# Patient Record
Sex: MALE | Race: BLACK OR AFRICAN AMERICAN | NOT HISPANIC OR LATINO | Employment: FULL TIME | URBAN - METROPOLITAN AREA
[De-identification: names, ages, dates, MRNs, and addresses within clinical notes are randomized per-mention and may not be internally consistent; named-entity substitution may affect disease eponyms.]

---

## 2017-04-24 ENCOUNTER — OFFICE VISIT (OUTPATIENT)
Dept: FAMILY MEDICINE | Facility: CLINIC | Age: 27
End: 2017-04-24

## 2017-04-24 VITALS
HEART RATE: 66 BPM | HEIGHT: 75 IN | DIASTOLIC BLOOD PRESSURE: 78 MMHG | WEIGHT: 175.2 LBS | TEMPERATURE: 97.8 F | SYSTOLIC BLOOD PRESSURE: 127 MMHG | BODY MASS INDEX: 21.78 KG/M2

## 2017-04-24 DIAGNOSIS — M20.5X1 ACQUIRED CLAW TOE OF RIGHT FOOT: Primary | ICD-10-CM

## 2017-04-24 DIAGNOSIS — L84 CALLUS OF FOOT: ICD-10-CM

## 2017-04-24 NOTE — PATIENT INSTRUCTIONS
Your referral has been scheduled for:    [facility]   [address] [place] [address]  [phone]  Date: Thursday May 18 2017  Time: 10: 00 AM Dr Moraes    If you have any questions or need to reschedule please call the number listed above.  Any other concerns please call our referral coordinator at [phone]    Le  Care Coordinator     GAVE TO PATIENT

## 2017-04-24 NOTE — PROGRESS NOTES
Preceptor attestation:  Patient seen and discussed with the resident. Assessment and plan reviewed with resident and agreed upon.  Supervising physician: Chio Bellamy  [facility]

## 2017-04-24 NOTE — LETTER
RETURN TO WORK/SCHOOL FORM    4/24/2017    Re: Sommer Means  1990      To Whom It May Concern:     Sommer Means was seen in clinic today..  He may return to work without restrictions on 4/25/17.  Please excuse from work 4/23/17 and 4/24/17.        Restrictions:  None      Lizzie Chow MD  4/24/2017 9:05 AM

## 2017-04-24 NOTE — PROGRESS NOTES
"       SUBJECTIVE       Sommer Means is a 26 year old  male with a PMH significant for:   There is no problem list on file for this patient.    He presents with concerns of pain over his right 5th toe.  He did not have any injury to the area and notes the pain is so severe that he is unable to wear shoes.       Patient is a [employer]  and required wearing close toed shoes.  The pain over his right foot has become so severe when he wears shoes that he cannot go to work and has missed two days.  He notes that over the last 6 months his 5th toes have slightly changed shape with the toe \"sticking up\" more.  He started to have a callous over the right and left toes about 2 months ago and the last two weeks the pain has increased.  No redness or drainage over the area.  He is otherwise healthy. No family history of diabetes or nerve problems.  He has had no numbness or tingling over the toes.  Has full strength.  No fevers or chills.  Otherwise feels well.      PMH, Medications and Allergies were reviewed and updated as needed.        REVIEW OF SYSTEMS     Pertinent review, per HPI.        OBJECTIVE     Vitals:    04/24/17 0856   BP: 127/78   Pulse: 66   Temp: 97.8  F (36.6  C)   Weight: 175 lb 3.2 oz (79.5 kg)   Height: 6' 3\" (190.5 cm)     Body mass index is 21.9 kg/(m^2).    Constitutional: Well appearing, no acute distress.  Respiratory: No respiratory distress  Extremities: Patient with claw deformity of 3rd, 4th (mild) and 5th digit of right and left toes with 1cm thickened area of hypertrophic skin and callous over the 5th right toe.  Mild hypertrophic skin over left 5th digit on dorsal surface, but worse on right vs left.  No ulceration or skin breakdown.  No erythema, fluctuance or warmth.  No other ulcerations  Neuro: monofilament testing is normal on right and left feet.  Sensation is full, gait normal.     No results found for this or any previous visit (from the past 24 hour(s)).        ASSESSMENT AND PLAN "     Right foot pain: Patient with extension of the proximal joint of the 3rd - 5th toes bilaterally causing clawing of the toes.  No numbness in the foot, patient healthy otherwise and 26 years old, BMI 21.  No other evidence of neuropathy and sensory exam was normal today.  Podiatry referral for assistance in shoe fitting, callous debridement and management.  Recommend follow up as needed.   - Podiatry   - Recommend wearing corn pads or bandaids to help prevent friction in the meantime  - Work note was given       RTC prn or sooner if develops new or worsening symptoms.      Lizzie Chow MD  PGY-3 [facility]

## 2017-04-24 NOTE — MR AVS SNAPSHOT
After Visit Summary   4/24/2017    Sommer Means    MRN: [id number]           Patient Information     Date Of Birth          1990        Visit Information        Provider Department      4/24/2017 8:40 AM Lizzie Chow MD [facility]        Today's Diagnoses     Acquired claw toe of right foot    -  1       Follow-ups after your visit        Additional Services     PODIATRY/FOOT & ANKLE SURGERY REFERRAL       Patient to stop at the [facility] Desk    Reason for Referral: Callous and claw foot deformity right 5th digit.  Foot pain.      needed: No  Language: English    May leave message on voicemail: Yes    ([place] Only) Referral should be tracked (Yes/No)?                  Future tests that were ordered for you today     Open Future Orders        Priority Expected Expires Ordered    PODIATRY/FOOT & ANKLE SURGERY REFERRAL Routine  4/24/2018 4/24/2017            Who to contact     Please call your clinic at [phone] to:    Ask questions about your health    Make or cancel appointments    Discuss your medicines    Learn about your test results    Speak to your doctor   If you have compliments or concerns about an experience at your clinic, or if you wish to file a complaint, please contact [facility] Patient Relations at [phone] or email us at [email or web address]         Additional Information About Your Visit        MyChart Information     [employer] is an electronic gateway that provides easy, online access to your medical records. With [employer], you can request a clinic appointment, read your test results, renew a prescription or communicate with your care team.     To sign up for [employer] visit the website at [email or web address]   You will be asked to enter the access code listed below, as well as some personal information. Please follow the directions to create your username and password.     Your access code is: [email or web address]  Expires:  "2017  9:16 AM     Your access code will  in 90 days. If you need help or a new code, please contact your [facility] or call [phone] for assistance.        Care EveryWhere ID     This is your Care EveryWhere ID. This could be used by other organizations to access your [facility] medical records  [id number]        Your Vitals Were     Pulse Temperature Height BMI (Body Mass Index)          66 97.8  F (36.6  C) 6' 3\" (190.5 cm) 21.9 kg/m2         Blood Pressure from Last 3 Encounters:   17 127/78    Weight from Last 3 Encounters:   17 175 lb 3.2 oz (79.5 kg)               Primary Care Provider Office Phone # Fax #    Kenny ArchibaldDO [phone]       [address] [address]        Thank you!     Thank you for choosing [facility]  for your care. Our goal is always to provide you with excellent care. Hearing back from our patients is one way we can continue to improve our services. Please take a few minutes to complete the written survey that you may receive in the mail after your visit with us. Thank you!             Your Updated Medication List - Protect others around you: Learn how to safely use, store and throw away your medicines at www.disposemymeds.org.      Notice  As of 2017  9:16 AM    You have not been prescribed any medications.      "

## 2021-09-12 ENCOUNTER — HOSPITAL ENCOUNTER (EMERGENCY)
Facility: CLINIC | Age: 31
Discharge: HOME | End: 2021-09-12
Attending: NURSE PRACTITIONER | Admitting: NURSE PRACTITIONER
Payer: COMMERCIAL

## 2021-09-12 ENCOUNTER — OFFICE VISIT (OUTPATIENT)
Dept: URGENT CARE | Facility: URGENT CARE | Age: 31
End: 2021-09-12
Payer: COMMERCIAL

## 2021-09-12 VITALS
RESPIRATION RATE: 16 BRPM | TEMPERATURE: 97.8 F | DIASTOLIC BLOOD PRESSURE: 86 MMHG | SYSTOLIC BLOOD PRESSURE: 138 MMHG | HEART RATE: 73 BPM | OXYGEN SATURATION: 99 %

## 2021-09-12 VITALS
SYSTOLIC BLOOD PRESSURE: 112 MMHG | HEART RATE: 79 BPM | OXYGEN SATURATION: 98 % | BODY MASS INDEX: 25.06 KG/M2 | DIASTOLIC BLOOD PRESSURE: 70 MMHG | WEIGHT: 200.5 LBS | TEMPERATURE: 97.5 F

## 2021-09-12 DIAGNOSIS — M79.603 ARM PAIN: ICD-10-CM

## 2021-09-12 DIAGNOSIS — L02.419 CELLULITIS AND ABSCESS OF UPPER EXTREMITY: Primary | ICD-10-CM

## 2021-09-12 DIAGNOSIS — L03.90 CELLULITIS: ICD-10-CM

## 2021-09-12 DIAGNOSIS — L03.119 CELLULITIS AND ABSCESS OF UPPER EXTREMITY: Primary | ICD-10-CM

## 2021-09-12 DIAGNOSIS — L02.419 ARM ABSCESS: ICD-10-CM

## 2021-09-12 PROCEDURE — 99283 EMERGENCY DEPT VISIT LOW MDM: CPT

## 2021-09-12 PROCEDURE — 250N000009 HC RX 250: Performed by: NURSE PRACTITIONER

## 2021-09-12 PROCEDURE — 271N000002 HC RX 271: Performed by: NURSE PRACTITIONER

## 2021-09-12 PROCEDURE — 10060 I&D ABSCESS SIMPLE/SINGLE: CPT

## 2021-09-12 PROCEDURE — 99204 OFFICE O/P NEW MOD 45 MIN: CPT | Performed by: STUDENT IN AN ORGANIZED HEALTH CARE EDUCATION/TRAINING PROGRAM

## 2021-09-12 RX ORDER — SULFAMETHOXAZOLE/TRIMETHOPRIM 800-160 MG
1 TABLET ORAL 2 TIMES DAILY
Qty: 20 TABLET | Refills: 0 | Status: SHIPPED | OUTPATIENT
Start: 2021-09-12 | End: 2021-09-22

## 2021-09-12 RX ORDER — METHYLCELLULOSE 4000CPS 30 %
POWDER (GRAM) MISCELLANEOUS ONCE
Status: COMPLETED | OUTPATIENT
Start: 2021-09-12 | End: 2021-09-12

## 2021-09-12 RX ADMIN — Medication: at 16:00

## 2021-09-12 ASSESSMENT — ENCOUNTER SYMPTOMS: WOUND: 1

## 2021-09-12 NOTE — ED PROVIDER NOTES
History   Chief Complaint:  Wound Check       The history is provided by the patient.      Sommer Means is a 30 year old male who presents with a wound check . 4 days ago, Sommer noticed a small projection on his left forearm that since today, has become accompanied with pain, redness, and tenderness surrounding the area, localized under the inner elbow and mid forearm, prompting ED visit. He denies experiencing any loss of sensation in his lower arm and other areas. He states he has a history of bumps similar today reappearing on different areas of his body, leaving scars behind. He denies any known allergies to medications. Has never been tested for MRSA.      Review of Systems   Skin: Positive for wound.   All other systems reviewed and are negative.        Allergies:  The patient has no known allergies.     Medications:  No current medications reported by the patient.     Past Medical History:    Acquired claw toe of right foot    Social History:  The patient is unaccompanied in the ED today.    Physical Exam     Patient Vitals for the past 24 hrs:   BP Temp Temp src Pulse Resp SpO2   09/12/21 1524 -- -- -- -- 16 --   09/12/21 1519 138/86 97.8  F (36.6  C) Oral 73 -- 99 %       Physical Exam  Constitutional:  Sitting up in bed, non-toxic appearing.   Head: Atraumatic.  Head moves freely with normal range of motion.   Eyes: Conjunctivae pink. EOMs intact.   Neck: Normal range of motion.   Cardiovascular: Intact distal pulses: radial pulse 2+ on the right, 2+ on the left.   Pulmonary/Chest: No respiratory distress.   Musculoskeletal: Able to flex and extend the left arm at the elbow and wrist with minimal discomfort. Distal capillary refill and sensation intact. Tendon function intact.  Neurological: Oriented to person, place, and time. No focal deficits. GCS 15  Skin: Palmar aspect of the left mid forearm to the elbow is swollen, tender and warm to touch. There is an area central to this with a pustule and  "surrounding fluctuance.       Emergency Department Course       Procedures      Procedure: Incision and Drainage     LOCATIONS:  Left forearm, palmar aspect.     ANESTHESIA:  Local field block using LET topically.    PREPARATION:  Cleansed with Hibiclens    PROCEDURE:  Area was incised with # 11 Blade (Sharp Point) with a Single Straight incision.  Wound treatment included Deloculation, Purulent Drainage and Expression of Material.  Packing consisted of No Packing.  Appropriate dressing was applied to cover the area.    Patient Status: Patient tolerated the procedure well. There were no complications.        Emergency Department Course:    Reviewed:  I reviewed nursing notes, vitals, past medical history and care everywhere    Assessments:  1546 I obtained history and examined the patient as noted above.     1640 I rechecked the patient and explained findings.     Interventions:  1549 LET Topical, Methylcellulose Powder Topical    Disposition:  The patient was discharged to home.       Impression & Plan     Medical Decision Making:  Sommer Means is a 30 year old male with an abscess and surrounding cellulitis. Normal vital signs here with no fever, no tachycardia and no hypotension to be concerned for sepsis. Exam with no concerns for fasciitis, infective tenosynovitis or lymphangitis. No concerns for compartment syndrome. I & D performed with purulent discharge. We discussed frequent warm compresses and wound care. Given recurrence of similar \"boils\" at various locations I suspect he has MRSA and have prescribed Bactrim for 10 days. He will follow up in clinic in 2-3 days for wound recheck. We reviewed reasons to return here. He is amenable to plan.              Diagnosis:    ICD-10-CM    1. Arm abscess  L02.419    2. Cellulitis  L03.90    3. Arm pain  M79.603        Discharge Medications:  Discharge Medication List as of 9/12/2021  4:53 PM      START taking these medications    Details "   sulfamethoxazole-trimethoprim (BACTRIM DS) 800-160 MG tablet Take 1 tablet by mouth 2 times daily for 10 days, Disp-20 tablet, R-0, Local Print             Scribe Disclosure:  I, Abdirizak Torin, am serving as a scribe at 3:20 PM on 9/12/2021 to document services personally performed by Hellen Moore APRN CNP based on my observations and the provider's statements to me.              Hellen Moore APRN CNP  09/12/21 1770

## 2021-09-12 NOTE — PROGRESS NOTES
SUBJECTIVE:  Sommer Means is an 30 year old male who presents for L arm pain/swelling.  Has had recurrent abscesses for most of his life but now has had one for about 3-4 days that is much worse than he has had before.  It is on his left arm.  He is not having fevers or chills but is having a lot of discomfort and pain and cannot bend his arm at the elbow due to this pain.  He has a lot of localized pain and discomfort in the region as well.    PMH:   has no past medical history on file.  Patient Active Problem List   Diagnosis     Acquired claw toe of right foot     Social History     Socioeconomic History     Marital status:      Spouse name: Not on file     Number of children: Not on file     Years of education: Not on file     Highest education level: Not on file   Occupational History     Not on file   Tobacco Use     Smoking status: Never Smoker     Smokeless tobacco: Never Used   Substance and Sexual Activity     Alcohol use: Not on file     Drug use: Not on file     Sexual activity: Not on file   Other Topics Concern     Not on file   Social History Narrative     Not on file     Social Determinants of Health     Financial Resource Strain:      Difficulty of Paying Living Expenses:    Food Insecurity:      Worried About Running Out of Food in the Last Year:      Ran Out of Food in the Last Year:    Transportation Needs:      Lack of Transportation (Medical):      Lack of Transportation (Non-Medical):    Physical Activity:      Days of Exercise per Week:      Minutes of Exercise per Session:    Stress:      Feeling of Stress :    Social Connections:      Frequency of Communication with Friends and Family:      Frequency of Social Gatherings with Friends and Family:      Attends Religion Services:      Active Member of Clubs or Organizations:      Attends Club or Organization Meetings:      Marital Status:    Intimate Partner Violence:      Fear of Current or Ex-Partner:      Emotionally Abused:       Physically Abused:      Sexually Abused:      No family history on file.    ALLERGIES:  Patient has no known allergies.    No current outpatient medications on file.     No current facility-administered medications for this visit.         ROS:  ROS is done and is negative for general/constitutional, eye, ENT, Respiratory, cardiovascular, GI, , Skin, musculoskeletal except as noted elsewhere.  All other review of systems negative except as noted elsewhere.    OBJECTIVE:  /70   Pulse 79   Temp 97.5  F (36.4  C) (Tympanic)   Wt 90.9 kg (200 lb 8 oz)   SpO2 98%   BMI 25.06 kg/m    GENERAL APPEARANCE: Alert, in visible discomfort.  EYES: Conjunctivae clear.  EARS: External ears normal.  NOSE: Normal, no drainage.  OROPHARYNX: MMM.  NECK: Supple, symmetrical.  RESP: no increased effort.  CV: good capillary refill.  ABDOMEN: nondistended.  SKIN: No ulcers, lesions or rash except for visible abscess on L arm with surrounding erythema and swelling that is very tender and painful.  He cannot bend his elbow due to the pain and has deep tenderness to palpation on his tendons in the elbow region.  MUSCULOSKELETAL: No gross deformities.  NEURO: No gross deficits, CN 2-12 grossly intact.    RESULTS  No results found for any visits on 09/12/21.  No results found for this or any previous visit (from the past 48 hour(s)).    ASSESSMENT/PLAN:  (L03.119,  L02.419) Cellulitis and abscess of upper extremity  (primary encounter diagnosis)  Comment: Patient has very obvious cellulitis and abscess of L arm.  I am concerned that the infection is severe and may have migrated into a deeper space seeing as he is almost exquisitely tender locally and also has a lot of pain with trying to flex his arm at the elbow.  This requires evaluation and treatment in an emergency setting, so discussed with him that he should proceed to the ED immediately.  He will go to [facility] ED.  Called the facility and handed off the patient.  Plan: As  above.    PPE worn: N95, goggles.    See [facility] for orders, medications, letters, patient instructions    Rodney Oconnor MD

## 2021-09-12 NOTE — DISCHARGE INSTRUCTIONS
Warm compresses to the area at least 4 times a day.     Take the antibiotic as directed.     Wound check in 2-3 days in clinic.

## 2022-05-21 ENCOUNTER — HEALTH MAINTENANCE LETTER (OUTPATIENT)
Age: 32
End: 2022-05-21

## 2022-07-15 ENCOUNTER — HOSPITAL ENCOUNTER (EMERGENCY)
Facility: CLINIC | Age: 32
Discharge: HOME | End: 2022-07-15
Attending: EMERGENCY MEDICINE | Admitting: EMERGENCY MEDICINE
Payer: COMMERCIAL

## 2022-07-15 VITALS
SYSTOLIC BLOOD PRESSURE: 119 MMHG | RESPIRATION RATE: 18 BRPM | OXYGEN SATURATION: 100 % | DIASTOLIC BLOOD PRESSURE: 84 MMHG | HEART RATE: 64 BPM

## 2022-07-15 DIAGNOSIS — L02.415 ABSCESS OF RIGHT LOWER LEG: ICD-10-CM

## 2022-07-15 PROCEDURE — 250N000009 HC RX 250: Performed by: EMERGENCY MEDICINE

## 2022-07-15 PROCEDURE — 10060 I&D ABSCESS SIMPLE/SINGLE: CPT

## 2022-07-15 PROCEDURE — 99283 EMERGENCY DEPT VISIT LOW MDM: CPT | Mod: 25

## 2022-07-15 PROCEDURE — 87070 CULTURE OTHR SPECIMN AEROBIC: CPT | Performed by: EMERGENCY MEDICINE

## 2022-07-15 RX ORDER — SULFAMETHOXAZOLE/TRIMETHOPRIM 800-160 MG
1 TABLET ORAL 2 TIMES DAILY
Qty: 14 TABLET | Refills: 0 | Status: SHIPPED | OUTPATIENT
Start: 2022-07-15 | End: 2022-07-22

## 2022-07-15 RX ORDER — LIDOCAINE 40 MG/G
1 CREAM TOPICAL ONCE
Status: COMPLETED | OUTPATIENT
Start: 2022-07-15 | End: 2022-07-15

## 2022-07-15 RX ADMIN — LIDOCAINE 1 APPLICATOR: 40 CREAM TOPICAL at 10:57

## 2022-07-15 NOTE — ED PROVIDER NOTES
History   Chief Complaint:  Wound Check       HPI   Sommer Means is a 31 year old male who presents with complaints of right lower leg circular pustular lesions for the past 2 weeks or so.  He has a longstanding history of frequent similar skin lesions going back to early childhood.  He is not known to have any medical problems.  He has had no recent febrile illness or other prodromal symptoms.  He is noting similar episode involving his left upper arm for which she was seen at [facility] in September 2021.  Review of records shows that he had an abscess I indeed and was treated with Bactrim for clinical suspicion of MRSA.  They did not perform an abscess culture at that time but he did report resolution of his symptoms with that treatment.  He traveled to [facility] in February to visit family but did not have any illnesses either there or on return.    Review of Systems   All other systems reviewed and are negative.    Allergies:  The patient has no known allergies.     Medications:  The patient is currently on no regular medications.    Past Medical History:     Claw toe of right foot     Social History:  The patient presents to the ED with    Physical Exam     Patient Vitals for the past 24 hrs:   BP Pulse Resp SpO2   07/15/22 1220 119/84 64 18 100 %   07/15/22 1019 -- -- 16 --       Physical Exam  General: well appearing.   HENT:  Normal nose, oropharynx.  Eyes: EOMI. Normal conjunctiva.  Neck:  Normal range of motion and appearance.   Cardiovascular:  Normal rate.  Pulmonary/Chest:  Effort normal.  Musculoskeletal: Normal range of motion. No edema or tenderness.   Neurological: oriented, normal strength, sensation, and coordination.   Skin: right lower leg notable for 2 small pustular lesions, one 1 cm vesicule/blister.  Psychiatric: Normal mood and affect. Normal behavior and judgement.      Emergency Department Course     Laboratory:  Labs Ordered and Resulted from Time of ED Arrival to  Time of ED Departure - No data to display     Procedures    Incision and Drainage     Procedure: Incision and Drainage     Consent: Verbal    Indication: Abscess    Location: Extremity, lower    Size: 0.25 cm x 2    Ultrasound Guidance: No    Preparation: betadine     Anesthesia/Sedation: Topical -LET     Procedure Detail:    Aspiration: No  Incision Type: Single straight  Scalpel: 11  Lesion Management: Probed and deloculated   Wound Management: Left open   Packing: None     Patient Status: The patient tolerated the procedure well: Yes. There were no complications.      Emergency Department Course:    Reviewed:  I reviewed nursing notes, vitals, past medical history and Care Everywhere    Assessments:  1030 I obtained history and examined the patient as noted above.   1145 I rechecked the patient and explained findings.     Interventions:  1057 Lidocaine 1 applicator Topical    Disposition:  The patient was discharged to home.     Impression & Plan   Medical Decision Making:  Afebrile well-appearing 31-year-old male arrives for evaluation of a couple small pustular lesions on his right lower leg.  He has had no associated fevers or other constitutional symptoms and is describing similar episodic skin outbreaks for many years since a child.  He was seen at [facility] in 2021 with left upper extremity infection at that time, it was suspected he may have MRSA.  There was no wound culture performed.  These lesions were incised with minimal purulent drainage and an abscess culture was sent.  I recommended a course of Bactrim and advised that he establish primary care and follow-up there for further management.  In the event that this is MRSA, he can talk with them about eradication measures.    Diagnosis:    ICD-10-CM    1. Abscess of right lower leg  L02.415        Discharge Medications:  New Prescriptions    SULFAMETHOXAZOLE-TRIMETHOPRIM (BACTRIM DS) 800-160 MG TABLET    Take 1 tablet by mouth 2 times  daily for 7 days       Scribe Disclosure:  I, Alejandro Tsang, am serving as a scribe at 10:29 AM on 7/15/2022 to document services personally performed by Evens Ordoñez MD based on my observations and statements to me.            Evens Ordoñez MD  07/16/22 2064

## 2022-07-15 NOTE — ED TRIAGE NOTES
Pt has several small, blistering, circular wounds to lower leg. States they have been there 2 weeks. Denies other symptoms. Denies recent travel.

## 2022-07-17 LAB — BACTERIA ABSC ANAEROBE+AEROBE CULT: ABNORMAL

## 2022-07-18 ENCOUNTER — TELEPHONE (OUTPATIENT)
Dept: EMERGENCY MEDICINE | Facility: CLINIC | Age: 32
End: 2022-07-18

## 2022-07-18 NOTE — TELEPHONE ENCOUNTER
[facility] Emergency Department Lab result notification    [place] ED lab result protocol used  Aerobic bacterial culture    Reason for call  Notify of lab results, assess symptoms,  review ED providers recommendations/discharge instructions (if necessary) and advise per ED lab result f/u protocol    Lab Result (including Rx patient on, if applicable)  Final Abscess Aerobic Bacterial Culture (specimen - R Leg) report on 7/18/22  [facility] Emergency Dept discharge antibiotic prescribed: Sulfamethoxazole-Trimethoprim (Bactrim DS, Septra DS) 800-160 mg PO tablet, 1 tablet by mouth 2 times daily for 7 days  #1. Bacteria, 2+ Staphylococcus aureus MRSA,  is [SUSCEPTIBLE] to antibiotic.  Incision and Drainage performed in the [place] ED: Yes  Recommendations in treatment per [facility] ED lab result culture protocol    Information table from Emergency Dept Provider visit on 7/15/22  Symptoms reported at ED visit (Chief complaint, HPI) Wound Check      HPI   Sommer Means is a 31 year old male who presents with complaints of right lower leg circular pustular lesions for the past 2 weeks or so.  He has a longstanding history of frequent similar skin lesions going back to early childhood.  He is not known to have any medical problems.  He has had no recent febrile illness or other prodromal symptoms.  He is noting similar episode involving his left upper arm for which she was seen at [facility] in September 2021.  Review of records shows that he had an abscess I indeed and was treated with Bactrim for clinical suspicion of MRSA.  They did not perform an abscess culture at that time but he did report resolution of his symptoms with that treatment.  He traveled to [facility] in February to visit family but did not have any illnesses either there or on return.   Significant Medical hx, if applicable (i.e. CKD, diabetes) NA   Allergies No Known Allergies   Weight, if applicable Wt  Readings from Last 2 Encounters:   09/12/21 90.9 kg (200 lb 8 oz)   04/24/17 79.5 kg (175 lb 3.2 oz)      Coumadin/Warfarin [Yes /No] No   Creatinine Level (mg/dl) No results found for: CR   Creatinine clearance (ml/min), if applicable Creatinine clearance cannot be calculated (No successful lab value found.)   ED providers Impression and Plan (applicable information) Afebrile well-appearing 31-year-old male arrives for evaluation of a couple small pustular lesions on his right lower leg.  He has had no associated fevers or other constitutional symptoms and is describing similar episodic skin outbreaks for many years since a child.  He was seen at [facility] in 2021 with left upper extremity infection at that time, it was suspected he may have MRSA.  There was no wound culture performed.  These lesions were incised with minimal purulent drainage and an abscess culture was sent.  I recommended a course of Bactrim and advised that he establish primary care and follow-up there for further management.  In the event that this is MRSA, he can talk with them about eradication measures.   ED diagnosis  Abscess of right lower leg    MRSA     ED provider Evens Ordoñez MD  07/16/22 2215       RN Assessment (Patient s current Symptoms), include time called.  [Insert Left message here if message left]  Pt is taking the Bactrim DS and symptoms not worsening in anyway. He has appt with PCP 7/25/22. Advised to call clinic and let them know it's MRSA ans he will run out of bactrim before than. They may be able to see him sooner or lengthen the antibiotic from 7 days. His wife developed sores, too. Went to  and was treated. Discussed MRSA in detail.       RN Recommendations/Instructions per [place] ED lab result protocol  Patient notified of lab result and treatment recommendations.  Advised to call clinic and let them know it's MRSA ans he will run out of bactrim before than. They may be able to see him  sooner or lengthen the antibiotic from 7 days. His wife developed sores, too. Went to  and was treated. Discussed MRSA in detail.     Please Contact your PCP clinic or return to the Emergency department if your:    Symptoms return.    Symptoms do not improve after 3 days on antibiotic.    Symptoms do not resolve after completing antibiotic.    Symptoms worsen or other concerning symptom's.      Leila Gautam RN  [facility]  Emergency Dept Lab Result RN  Ph# [phone]

## 2022-07-25 ENCOUNTER — OFFICE VISIT (OUTPATIENT)
Dept: FAMILY MEDICINE | Facility: CLINIC | Age: 32
End: 2022-07-25
Payer: COMMERCIAL

## 2022-07-25 VITALS
WEIGHT: 200.8 LBS | HEIGHT: 75 IN | TEMPERATURE: 97.7 F | OXYGEN SATURATION: 97 % | SYSTOLIC BLOOD PRESSURE: 161 MMHG | BODY MASS INDEX: 24.97 KG/M2 | DIASTOLIC BLOOD PRESSURE: 82 MMHG | HEART RATE: 83 BPM

## 2022-07-25 DIAGNOSIS — L02.93 CARBUNCLE: Primary | ICD-10-CM

## 2022-07-25 DIAGNOSIS — A49.02 MRSA INFECTION: ICD-10-CM

## 2022-07-25 PROCEDURE — 99213 OFFICE O/P EST LOW 20 MIN: CPT | Performed by: PHYSICIAN ASSISTANT

## 2022-07-25 ASSESSMENT — PAIN SCALES - GENERAL: PAINLEVEL: NO PAIN (0)

## 2022-07-25 NOTE — PROGRESS NOTES
"  Assessment & Plan       ICD-10-CM    1. Carbuncle  L02.93 Adult Dermatology Referral   2. 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 Healing well currently, no need for additional antibx. Referred to derm for further evaluation and management, cannot r/o underlying recurrent inflammatory condition.    Return in about 1 week (around 8/1/2022), or if symptoms worsen or fail to improve.    SHELLIE Chen [facility] LEO Novoa is a 31 year old, presenting for the following health issues:  Hospital F/U      HPI     ED/UC Followup:    Facility: [facility]  Date of visit: 7/15/2022  Reason for visit: wound check  Current Status: Healing    - Patient seen in ED 7/15/2022 for recurrent abscess, this time on RLE. Treated with Bactrim x7 days. Wound was cultured and grew MRSA.  - Patient states he has had these recur since childhood, frequently require I&D    Review of Systems   Constitutional, HEENT, cardiovascular, pulmonary, GI, , musculoskeletal, neuro, skin, endocrine and psych systems are negative, except as otherwise noted.      Objective    BP (!) 161/82   Pulse 83   Temp 97.7  F (36.5  C) (Temporal)   Ht 1.905 m (6' 3\")   Wt 91.1 kg (200 lb 12.8 oz)   SpO2 97%   BMI 25.10 kg/m    Body mass index is 25.1 kg/m .  Physical Exam   GENERAL:  WDWN, no acute distress  PSYCH: pleasant, cooperative  EYES: no discharge, no injection  HENT:  Normocephalic. Moist mucus membranes.  NECK:  Supple, symmetric  EXTREMITIES:  No gross deformities, moves all 4 limbs spontaneously  SKIN:  Warm and dry, healing wounds on RLE  NEUROLOGIC:  alert, sensation grossly intact.                    .  ..  "

## 2022-09-11 ENCOUNTER — HEALTH MAINTENANCE LETTER (OUTPATIENT)
Age: 32
End: 2022-09-11

## 2023-06-17 ENCOUNTER — OFFICE VISIT (OUTPATIENT)
Dept: URGENT CARE | Facility: URGENT CARE | Age: 33
End: 2023-06-17
Payer: COMMERCIAL

## 2023-06-17 VITALS
SYSTOLIC BLOOD PRESSURE: 143 MMHG | BODY MASS INDEX: 24.52 KG/M2 | OXYGEN SATURATION: 100 % | RESPIRATION RATE: 16 BRPM | HEART RATE: 79 BPM | WEIGHT: 196.2 LBS | TEMPERATURE: 98.7 F | DIASTOLIC BLOOD PRESSURE: 79 MMHG

## 2023-06-17 DIAGNOSIS — L08.9 INFECTION OF SKIN DUE TO METHICILLIN RESISTANT STAPHYLOCOCCUS AUREUS (MRSA): Primary | ICD-10-CM

## 2023-06-17 DIAGNOSIS — B95.62 INFECTION OF SKIN DUE TO METHICILLIN RESISTANT STAPHYLOCOCCUS AUREUS (MRSA): Primary | ICD-10-CM

## 2023-06-17 PROCEDURE — 99213 OFFICE O/P EST LOW 20 MIN: CPT | Performed by: NURSE PRACTITIONER

## 2023-06-17 PROCEDURE — 87077 CULTURE AEROBIC IDENTIFY: CPT | Performed by: NURSE PRACTITIONER

## 2023-06-17 PROCEDURE — 87186 SC STD MICRODIL/AGAR DIL: CPT | Performed by: NURSE PRACTITIONER

## 2023-06-17 PROCEDURE — 87070 CULTURE OTHR SPECIMN AEROBIC: CPT | Performed by: NURSE PRACTITIONER

## 2023-06-17 RX ORDER — SULFAMETHOXAZOLE/TRIMETHOPRIM 800-160 MG
1 TABLET ORAL 2 TIMES DAILY
Qty: 20 TABLET | Refills: 0 | Status: SHIPPED | OUTPATIENT
Start: 2023-06-17 | End: 2023-06-27

## 2023-06-17 NOTE — PROGRESS NOTES
Chief Complaint   Patient presents with     Mass     Patient present with recurrent carbuncles with possible MRSA. Patient states he was treated for this last year and the year before.          ICD-10-CM    1. Infection of skin due to methicillin resistant Staphylococcus aureus (MRSA)  L08.9     B95.62       Will treat patient with Bactrim again.  Wound culture obtained.  Dermatology referral made again.      Mame Means is an 32 year old male who presents to clinic today for multiple small pustules all over body.  He does have a history of MRSA and reports this is how it appears.  He was treated last summer with Bactrim and all symptoms cleared.  He was also treated approximately 1 year before that with the same antibiotics with success.  He is concerned as to why this keeps recurring.  He did receive a referral to dermatology the last time he was seen but never was able to follow through with appointment.      ROS: 10 point ROS neg other than the symptoms noted above in the HPI.       Objective    BP (!) 143/79 (BP Location: Left arm, Patient Position: Sitting, Cuff Size: Adult Regular)   Pulse 79   Temp 98.7  F (37.1  C) (Oral)   Resp 16   Wt 89 kg (196 lb 3.2 oz)   SpO2 100%   BMI 24.52 kg/m    Nurses notes and VS have been reviewed.    Physical Exam       GENERAL APPEARANCE: healthy appearing, alert     EYES: PERRL, EOMI, sclera non-icteric     HENT: oral exam benign, mucus membranes intact, without ulcers or lesions     NECK: no adenopathy or asymmetry, thyroid normal to palpation     MS: extremities normal- no gross deformities noted; normal muscle tone.     SKIN: Multiple 2 to 3 mm pustules on legs, buttocks and back      EDU Xavier, CNP  [facility] Provider    The use of Dragon/EBIQUOUS dictation services may have been used to construct the content in this note; any grammatical or spelling errors are non-intentional. Please contact the author of this note directly  if you are in need of any clarification.

## 2023-06-19 ENCOUNTER — TELEPHONE (OUTPATIENT)
Dept: DERMATOLOGY | Facility: CLINIC | Age: 33
End: 2023-06-19
Payer: COMMERCIAL

## 2023-06-19 LAB
BACTERIA WND CULT: ABNORMAL
BACTERIA WND CULT: ABNORMAL

## 2023-06-19 NOTE — TELEPHONE ENCOUNTER
[facility] Call Center    Phone Message    May a detailed message be left on voicemail: yes     Reason for Call: Rash - Pre-existing - Infection of skin due to methicillin resistant Staphylococcus aureus (MRSA)  Pt has a referral and scheduled the soonest available Appt and wait listed for 01/03/24. Please review and call pt if needed. Since pt does have infected skin at this time. Thanks     Action Taken: Message routed to:  Other: OX DERM    Travel Screening: Not Applicable

## 2023-06-21 ENCOUNTER — OFFICE VISIT (OUTPATIENT)
Dept: DERMATOLOGY | Facility: CLINIC | Age: 33
End: 2023-06-21
Attending: NURSE PRACTITIONER
Payer: COMMERCIAL

## 2023-06-21 DIAGNOSIS — B95.62 INFECTION OF SKIN DUE TO METHICILLIN RESISTANT STAPHYLOCOCCUS AUREUS (MRSA): ICD-10-CM

## 2023-06-21 DIAGNOSIS — L08.9 INFECTION OF SKIN DUE TO METHICILLIN RESISTANT STAPHYLOCOCCUS AUREUS (MRSA): ICD-10-CM

## 2023-06-21 DIAGNOSIS — L02.92 FURUNCULOSIS: Primary | ICD-10-CM

## 2023-06-21 PROCEDURE — 99204 OFFICE O/P NEW MOD 45 MIN: CPT | Performed by: STUDENT IN AN ORGANIZED HEALTH CARE EDUCATION/TRAINING PROGRAM

## 2023-06-21 RX ORDER — CLINDAMYCIN AND BENZOYL PEROXIDE 10; 50 MG/G; MG/G
GEL TOPICAL 2 TIMES DAILY
Qty: 50 G | Refills: 11 | Status: SHIPPED | OUTPATIENT
Start: 2023-06-21

## 2023-06-21 RX ORDER — IBUPROFEN 800 MG/1
800 TABLET, FILM COATED ORAL EVERY 8 HOURS PRN
Qty: 21 TABLET | Refills: 0 | Status: SHIPPED | OUTPATIENT
Start: 2023-06-21

## 2023-06-21 ASSESSMENT — PAIN SCALES - GENERAL: PAINLEVEL: EXTREME PAIN (8)

## 2023-06-21 NOTE — PATIENT INSTRUCTIONS
Take ibuprofren 800 mg 3x daily for 7 days. Elevate your foot above the level of your heart as often as possible    Wash daily with benzoyl peroxide wash to areas of flares  Apply benzaclin gel to spots as they start to form, twice daily

## 2023-06-21 NOTE — LETTER
6/21/2023         RE: Sommer Means  [id number] [facility] [id number]        Dear Colleague,    Thank you for referring your patient, Sommer Means, to the [facility]. Please see a copy of my visit note below.    [facility] Dermatology Note    Encounter Date: Jun 21, 2023    Dermatology Problem List:    ______________________________________    Impression/Plan:  Sommer was seen today for derm problem.    Diagnoses and all orders for this visit:    Furunculosis  Infection of skin due to methicillin resistant Staphylococcus aureus (MRSA)  -     Adult Dermatology Referral  -     clindamycin-benzoyl peroxide (BENZACLIN) 1-5 % external gel; Apply topically 2 times daily  -     ibuprofen (ADVIL/MOTRIN) 800 MG tablet; Take 1 tablet (800 mg) by mouth every 8 hours as needed for moderate pain  -     benzoyl peroxide 5 % external liquid; Use daily to prevent flares  --Happens usually in the summer to fall months, unsure of any activities that may be triggered.  - Typically gets Bactrim for 10-day course which completely resolves it, this is happened for the last 3 years, lesions are mainly confined to the extremities they are swollen tender red filled with pus which she is able to pop and drain  - Currently on Bactrim, has questions about why this keeps happening and what he can do about it  - Discussed that I cannot say for sure exactly why it is happening but am encouraged to hear that the symptoms are intermittent happening only about once per year and that they respond quickly to the appropriate antibiotics  - Recommend maintenance treatment with benzyl peroxide wash in the shower daily as well as BenzaClin gel to incipient lesions  - Has some ankle swelling which may be due to exuberant inflammation in the area possibly resolving associated cellulitis prescribe ibuprofen 800 mg 3 times daily x7 days and advised him to keep his foot  elevated        Follow-up in 3-6 mo.       Staff Involved:  Staff Only    Parish Paredes MD   of Dermatology  Department of Dermatology  [facility]      CC:   Chief Complaint   Patient presents with     Derm Problem       History of Present Illness:  Mr. Sommer Means is a 32 year old male who presents as a new patient.    H of MRSA w/ intermittent flares , takes bactrim when he has a flare which is ~1 year. Takes bactrim for about 10 days which clears it up within that 10 days.     Travels in the summer to [place], [place].     Labs:      Physical exam:  Vitals: There were no vitals taken for this visit.  GEN: well developed, well-nourished, in no acute distress, in a pleasant mood.     SKIN: Jimenez phototype 5  - Focused examination of the legs and arms was performed.  -Resolving inflammatory papules with circumferential superficial desquamation  - No other lesions of concern on areas examined.     Past Medical History:   History reviewed. No pertinent past medical history.  No past surgical history on file.    Social History:   reports that he has never smoked. He has never used smokeless tobacco. He reports that he does not drink alcohol and does not use drugs.    Family History:  History reviewed. No pertinent family history.    Medications:  Current Outpatient Medications   Medication Sig Dispense Refill     benzoyl peroxide 5 % external liquid Use daily to prevent flares 236 mL 3     clindamycin-benzoyl peroxide (BENZACLIN) 1-5 % external gel Apply topically 2 times daily 50 g 11     ibuprofen (ADVIL/MOTRIN) 800 MG tablet Take 1 tablet (800 mg) by mouth every 8 hours as needed for moderate pain 21 tablet 0     sulfamethoxazole-trimethoprim (BACTRIM DS) 800-160 MG tablet Take 1 tablet by mouth 2 times daily for 10 days 20 tablet 0     No Known Allergies                Again, thank you for allowing me to participate in the care of your patient.         Sincerely,        Parish Paredes MD

## 2023-06-21 NOTE — PROGRESS NOTES
[facility] Dermatology Note    Encounter Date: Jun 21, 2023    Dermatology Problem List:    ______________________________________    Impression/Plan:  Sommer was seen today for derm problem.    Diagnoses and all orders for this visit:    Furunculosis  Infection of skin due to methicillin resistant Staphylococcus aureus (MRSA)  -     Adult Dermatology Referral  -     clindamycin-benzoyl peroxide (BENZACLIN) 1-5 % external gel; Apply topically 2 times daily  -     ibuprofen (ADVIL/MOTRIN) 800 MG tablet; Take 1 tablet (800 mg) by mouth every 8 hours as needed for moderate pain  -     benzoyl peroxide 5 % external liquid; Use daily to prevent flares  --Happens usually in the summer to fall months, unsure of any activities that may be triggered.  - Typically gets Bactrim for 10-day course which completely resolves it, this is happened for the last 3 years, lesions are mainly confined to the extremities they are swollen tender red filled with pus which she is able to pop and drain  - Currently on Bactrim, has questions about why this keeps happening and what he can do about it  - Discussed that I cannot say for sure exactly why it is happening but am encouraged to hear that the symptoms are intermittent happening only about once per year and that they respond quickly to the appropriate antibiotics  - Recommend maintenance treatment with benzyl peroxide wash in the shower daily as well as BenzaClin gel to incipient lesions  - Has some ankle swelling which may be due to exuberant inflammation in the area possibly resolving associated cellulitis prescribe ibuprofen 800 mg 3 times daily x7 days and advised him to keep his foot elevated        Follow-up in 3-6 mo.       Staff Involved:  Staff Only    Parish Paredes MD   of Dermatology  Department of Dermatology  [facility]      CC:   Chief Complaint   Patient presents with     Derm Problem       History of  Present Illness:  Mr. Sommer Means is a 32 year old male who presents as a new patient.    H of MRSA w/ intermittent flares , takes bactrim when he has a flare which is ~1 year. Takes bactrim for about 10 days which clears it up within that 10 days.     Travels in the summer to [place], [place].     Labs:      Physical exam:  Vitals: There were no vitals taken for this visit.  GEN: well developed, well-nourished, in no acute distress, in a pleasant mood.     SKIN: Jimenez phototype 5  - Focused examination of the legs and arms was performed.  -Resolving inflammatory papules with circumferential superficial desquamation  - No other lesions of concern on areas examined.     Past Medical History:   History reviewed. No pertinent past medical history.  No past surgical history on file.    Social History:   reports that he has never smoked. He has never used smokeless tobacco. He reports that he does not drink alcohol and does not use drugs.    Family History:  History reviewed. No pertinent family history.    Medications:  Current Outpatient Medications   Medication Sig Dispense Refill     benzoyl peroxide 5 % external liquid Use daily to prevent flares 236 mL 3     clindamycin-benzoyl peroxide (BENZACLIN) 1-5 % external gel Apply topically 2 times daily 50 g 11     ibuprofen (ADVIL/MOTRIN) 800 MG tablet Take 1 tablet (800 mg) by mouth every 8 hours as needed for moderate pain 21 tablet 0     sulfamethoxazole-trimethoprim (BACTRIM DS) 800-160 MG tablet Take 1 tablet by mouth 2 times daily for 10 days 20 tablet 0     No Known Allergies

## 2023-06-22 ENCOUNTER — TELEPHONE (OUTPATIENT)
Dept: DERMATOLOGY | Facility: CLINIC | Age: 33
End: 2023-06-22
Payer: COMMERCIAL

## 2023-06-22 NOTE — TELEPHONE ENCOUNTER
S/w [facility] who states pt did  the 3 rxs that were prescribed yesterday 6/21 by Dr. Paredes.    S/w pt and advised the ibuprofen is the anti-inflammatory medication and used to treat pain.  Pt states understanding.    Gricel HAINES RN  [facility] Dermatology [facility]  [phone]

## 2023-06-22 NOTE — TELEPHONE ENCOUNTER
Health Call Center    Phone Message    May a detailed message be left on voicemail: yes     Reason for Call: Medication Question or concern regarding medication   Prescription Clarification  Name of Medication: anti inflammatory med  Prescribing Provider: Dr. Paredes   Pharmacy: [facility]/PH[facility]ACY #[id number] [id number]   What on the order needs clarification? Pt called and saw Dr. Paredes yesterday and he said he was going to prescribe a medication for the pt. Pt said that his pharm has not received anything. Pt would like for Dr. Paredes to prescribed it and send it to his pharm above. If you have any questions, please reach out to the pt. Thanks           Action Taken: Message routed to:  Other: OX DERM    Travel Screening: Not Applicable

## 2023-07-29 ENCOUNTER — HEALTH MAINTENANCE LETTER (OUTPATIENT)
Age: 33
End: 2023-07-29

## 2024-01-17 ENCOUNTER — OFFICE VISIT (OUTPATIENT)
Dept: DERMATOLOGY | Facility: CLINIC | Age: 34
End: 2024-01-17
Payer: COMMERCIAL

## 2024-01-17 DIAGNOSIS — L02.92 FURUNCULOSIS: Primary | ICD-10-CM

## 2024-01-17 PROCEDURE — 99214 OFFICE O/P EST MOD 30 MIN: CPT | Performed by: STUDENT IN AN ORGANIZED HEALTH CARE EDUCATION/TRAINING PROGRAM

## 2024-01-17 RX ORDER — MUPIROCIN 20 MG/G
OINTMENT TOPICAL
Qty: 30 G | Refills: 11 | Status: SHIPPED | OUTPATIENT
Start: 2024-01-17

## 2024-01-17 NOTE — PATIENT INSTRUCTIONS
"Follow instructions on mupirocin Rx for decolonization protocol   See bolded and underlined instructions below on how to make dilute bleach solution in spray bottle     Dilute Bleach Bath Instructions     What are dilute bleach baths?  Dilute bleach baths are used to help fight bacteria that is commonly found on the skin; this bacteria may be preventing your skin from healing. If is also used to calm inflammation in skin, even if infection is not present. The dilution ratio we recommend is the same concentration that is in a swimming pool. This technique is safe and can help prevent your infant or child from needing oral antibiotics for basic staph bacteria on the skin.       Type of bleach:  - Regular, plain, household bleach used for cleaning clothing. Brand or Generic is okay.   - Make sure this is plain or concentrated bleach. The bleach bottle should not contain any of the following words \"pour safe, with fabric protection, with cloromax technology, splash free, splash less, gentle or color safe.\"   - There should not be any added fragrance to the bleach; such a lavender.     How do I make a dilute bleach bath?  - Pour 1/3 of concentrated bleach or 1/2 cup of plain of bleach into an adult size bath tub of 4-6 inches of lukewarm water.  - For smaller tubs (infant size tubs), add two tablespoons of bleach to the tub water.   - You can even add a tablespoon to a spray bottle and spray on the mixture in the shower before washing it off  - Bleach baths work better if you are able to submerge most of the  - Repeat bleach baths as recommended by your provider.     Other information:  - Do not pour bleach directly onto the skin.  - If is safe to get the bleach mixture on your face and scalp.  - Do not drink the bleach mixture.  - Keep bleach bottle out of reach of children        You can also try CLn wash which has dilute bleach in it     "

## 2024-01-17 NOTE — PROGRESS NOTES
[facility] Dermatology Note    Encounter Date: Jan 17, 2024    Dermatology Problem List:    ______________________________________    Impression/Plan:  Sommer was seen today for derm problem.    Diagnoses and all orders for this visit:    Furunculosis (Chronic flaring)  -     mupirocin (BACTROBAN) 2 % external ointment; Use 2 times a day to nostrils, post auricular area, armpits and groin for 1 week out of the month. 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       Staff Involved:  Staff Only    Parish Paredes MD   of Dermatology  Department of Dermatology  [facility]      CC:   Chief Complaint   Patient presents with    Derm Problem     Follow up cyst on the L leg, had this since jan 1st,  comes and goes, denies pain and discomfort        History of Present Illness:  Mr. Sommer CARRILLO [facility] is a 33 year old male who presents as a return patient.    Was last seen June 2023 for for ankylosis that is worse in the summer and fall months.  Typically gets Bactrim for 10-day course which completely resolves it.  This has happened for the last 3 years and the lesions are mainly confined to the extremities.  Discussed unclear cause as to why this happens in a cyclic pattern yearly.  Recommended maintenance with benzyl peroxide wash in the shower as well as BenzaClin gel to lesions as they are starting.    Felt like benzaclin did not help prevent, but does help expedite resolution by several days     Labs:      Physical exam:  Vitals: There were no vitals taken for this visit.  GEN: well developed, well-nourished, in no acute distress, in a pleasant mood.     SKIN: Jimenez phototype 4  - Focused examination of  the legs and abdomen was performed.  -Resolving centrifugal desquamation on leg lesion with central hypopigmentation  - No other lesions of concern on areas examined.     Past Medical History:   History reviewed. No pertinent past medical history.  History reviewed. No pertinent surgical history.    Social History:   reports that he has never smoked. He has never used smokeless tobacco. He reports that he does not drink alcohol and does not use drugs.    Family History:  History reviewed. No pertinent family history.    Medications:  Current Outpatient Medications   Medication Sig Dispense Refill    benzoyl peroxide 5 % external liquid Use daily to prevent flares 236 mL 3    clindamycin-benzoyl peroxide (BENZACLIN) 1-5 % external gel Apply topically 2 times daily 50 g 11    ibuprofen (ADVIL/MOTRIN) 800 MG tablet Take 1 tablet (800 mg) by mouth every 8 hours as needed for moderate pain 21 tablet 0    mupirocin (BACTROBAN) 2 % external ointment Use 2 times a day to nostrils, post auricular area, armpits and groin for 1 week out of the month. Repeat monthly for 3 months 30 g 11     No Known Allergies

## 2024-01-17 NOTE — LETTER
1/17/2024         RE: Sommer Means  [id number] [address] [facility] [id number]        Dear Colleague,    Thank you for referring your patient, Sommer Means, to the [facility]. Please see a copy of my visit note below.    [facility] Dermatology Note    Encounter Date: Jan 17, 2024    Dermatology Problem List:    ______________________________________    Impression/Plan:  Sommer was seen today for derm problem.    Diagnoses and all orders for this visit:    Furunculosis (Chronic flaring)  -     mupirocin (BACTROBAN) 2 % external ointment; Use 2 times a day to nostrils, post auricular area, armpits and groin for 1 week out of the month. 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       Staff Involved:  Staff Only    Parish Paredes MD   of Dermatology  Department of Dermatology  [facility]      CC:   Chief Complaint   Patient presents with     Derm Problem     Follow up cyst on the L leg, had this since jan 1st,  comes and goes, denies pain and discomfort        History of Present Illness:  Mr. Sommer Means is a 33 year old male who presents as a return patient.    Was last seen June 2023 for for ankylosis that is worse in the summer and fall months.  Typically gets Bactrim for 10-day course which completely resolves it.  This has happened for the last 3 years and the lesions are mainly confined to the extremities.  Discussed unclear cause as to why this happens in a cyclic pattern yearly.  Recommended maintenance with benzyl peroxide wash in the shower as well as BenzaClin gel to lesions as they are starting.    Felt like benzaclin did not help prevent, but does  help expedite resolution by several days     Labs:      Physical exam:  Vitals: There were no vitals taken for this visit.  GEN: well developed, well-nourished, in no acute distress, in a pleasant mood.     SKIN: Jimenez phototype 4  - Focused examination of the legs and abdomen was performed.  -Resolving centrifugal desquamation on leg lesion with central hypopigmentation  - No other lesions of concern on areas examined.     Past Medical History:   History reviewed. No pertinent past medical history.  History reviewed. No pertinent surgical history.    Social History:   reports that he has never smoked. He has never used smokeless tobacco. He reports that he does not drink alcohol and does not use drugs.    Family History:  History reviewed. No pertinent family history.    Medications:  Current Outpatient Medications   Medication Sig Dispense Refill     benzoyl peroxide 5 % external liquid Use daily to prevent flares 236 mL 3     clindamycin-benzoyl peroxide (BENZACLIN) 1-5 % external gel Apply topically 2 times daily 50 g 11     ibuprofen (ADVIL/MOTRIN) 800 MG tablet Take 1 tablet (800 mg) by mouth every 8 hours as needed for moderate pain 21 tablet 0     mupirocin (BACTROBAN) 2 % external ointment Use 2 times a day to nostrils, post auricular area, armpits and groin for 1 week out of the month. Repeat monthly for 3 months 30 g 11     No Known Allergies              Again, thank you for allowing me to participate in the care of your patient.        Sincerely,        Parish Paredes MD

## 2024-09-21 ENCOUNTER — HEALTH MAINTENANCE LETTER (OUTPATIENT)
Age: 34
End: 2024-09-21